# Patient Record
Sex: FEMALE | Race: WHITE | NOT HISPANIC OR LATINO | Employment: STUDENT | URBAN - METROPOLITAN AREA
[De-identification: names, ages, dates, MRNs, and addresses within clinical notes are randomized per-mention and may not be internally consistent; named-entity substitution may affect disease eponyms.]

---

## 2017-11-14 ENCOUNTER — HOSPITAL ENCOUNTER (OUTPATIENT)
Dept: RADIOLOGY | Facility: HOSPITAL | Age: 22
Discharge: HOME | End: 2017-11-14
Payer: COMMERCIAL

## 2017-11-14 ENCOUNTER — TRANSCRIBE ORDERS (OUTPATIENT)
Dept: ADMINISTRATIVE | Facility: HOSPITAL | Age: 22
End: 2017-11-14

## 2017-11-14 DIAGNOSIS — R05.9 COUGH: ICD-10-CM

## 2017-11-14 DIAGNOSIS — R05.9 COUGH: Primary | ICD-10-CM

## 2017-11-14 PROCEDURE — 71020 HB CHEST X-RAY 2VW FRONTAL&LATL: CPT

## 2019-03-28 ENCOUNTER — HOSPITAL ENCOUNTER (EMERGENCY)
Facility: HOSPITAL | Age: 24
Discharge: HOME | End: 2019-03-28
Attending: EMERGENCY MEDICINE | Admitting: EMERGENCY MEDICINE

## 2019-03-28 ENCOUNTER — TRANSCRIBE ORDERS (OUTPATIENT)
Dept: ADMINISTRATIVE | Facility: HOSPITAL | Age: 24
End: 2019-03-28

## 2019-03-28 ENCOUNTER — APPOINTMENT (EMERGENCY)
Dept: ULTRASOUND IMAGING | Facility: HOSPITAL | Age: 24
End: 2019-03-28

## 2019-03-28 VITALS
BODY MASS INDEX: 24.1 KG/M2 | HEART RATE: 82 BPM | RESPIRATION RATE: 18 BRPM | HEIGHT: 63 IN | OXYGEN SATURATION: 97 % | WEIGHT: 136 LBS | DIASTOLIC BLOOD PRESSURE: 63 MMHG | SYSTOLIC BLOOD PRESSURE: 125 MMHG

## 2019-03-28 DIAGNOSIS — M79.662 PAIN OF LEFT CALF: Primary | ICD-10-CM

## 2019-03-28 DIAGNOSIS — M79.605 LEFT LEG PAIN: Primary | ICD-10-CM

## 2019-03-28 LAB
ANION GAP SERPL CALCULATED.3IONS-SCNC: 10 MMOL/L (ref 4–13)
BASOPHILS # BLD AUTO: 0.03 THOUSANDS/ΜL (ref 0–0.1)
BASOPHILS NFR BLD AUTO: 0 % (ref 0–1)
BUN SERPL-MCNC: 14 MG/DL (ref 5–25)
CALCIUM SERPL-MCNC: 9.2 MG/DL (ref 8.3–10.1)
CHLORIDE SERPL-SCNC: 105 MMOL/L (ref 100–108)
CO2 SERPL-SCNC: 27 MMOL/L (ref 21–32)
CREAT SERPL-MCNC: 0.74 MG/DL (ref 0.6–1.3)
EOSINOPHIL # BLD AUTO: 0.2 THOUSAND/ΜL (ref 0–0.61)
EOSINOPHIL NFR BLD AUTO: 2 % (ref 0–6)
ERYTHROCYTE [DISTWIDTH] IN BLOOD BY AUTOMATED COUNT: 11.6 % (ref 11.6–15.1)
EXT PREG TEST URINE: NEGATIVE
GFR SERPL CREATININE-BSD FRML MDRD: 115 ML/MIN/1.73SQ M
GLUCOSE SERPL-MCNC: 105 MG/DL (ref 65–140)
HCT VFR BLD AUTO: 40.1 % (ref 34.8–46.1)
HGB BLD-MCNC: 13.9 G/DL (ref 11.5–15.4)
IMM GRANULOCYTES # BLD AUTO: 0.02 THOUSAND/UL (ref 0–0.2)
IMM GRANULOCYTES NFR BLD AUTO: 0 % (ref 0–2)
LYMPHOCYTES # BLD AUTO: 3.48 THOUSANDS/ΜL (ref 0.6–4.47)
LYMPHOCYTES NFR BLD AUTO: 41 % (ref 14–44)
MCH RBC QN AUTO: 31.6 PG (ref 26.8–34.3)
MCHC RBC AUTO-ENTMCNC: 34.7 G/DL (ref 31.4–37.4)
MCV RBC AUTO: 91 FL (ref 82–98)
MONOCYTES # BLD AUTO: 0.44 THOUSAND/ΜL (ref 0.17–1.22)
MONOCYTES NFR BLD AUTO: 5 % (ref 4–12)
NEUTROPHILS # BLD AUTO: 4.31 THOUSANDS/ΜL (ref 1.85–7.62)
NEUTS SEG NFR BLD AUTO: 52 % (ref 43–75)
NRBC BLD AUTO-RTO: 0 /100 WBCS
PLATELET # BLD AUTO: 248 THOUSANDS/UL (ref 149–390)
PMV BLD AUTO: 10.3 FL (ref 8.9–12.7)
POTASSIUM SERPL-SCNC: 3.5 MMOL/L (ref 3.5–5.3)
RBC # BLD AUTO: 4.4 MILLION/UL (ref 3.81–5.12)
SODIUM SERPL-SCNC: 142 MMOL/L (ref 136–145)
WBC # BLD AUTO: 8.48 THOUSAND/UL (ref 4.31–10.16)

## 2019-03-28 PROCEDURE — 36415 COLL VENOUS BLD VENIPUNCTURE: CPT | Performed by: PHYSICIAN ASSISTANT

## 2019-03-28 PROCEDURE — 80048 BASIC METABOLIC PNL TOTAL CA: CPT | Performed by: PHYSICIAN ASSISTANT

## 2019-03-28 PROCEDURE — 93971 EXTREMITY STUDY: CPT

## 2019-03-28 PROCEDURE — 85025 COMPLETE CBC W/AUTO DIFF WBC: CPT | Performed by: PHYSICIAN ASSISTANT

## 2019-03-28 PROCEDURE — 99284 EMERGENCY DEPT VISIT MOD MDM: CPT

## 2019-03-28 PROCEDURE — 81025 URINE PREGNANCY TEST: CPT | Performed by: PHYSICIAN ASSISTANT

## 2019-03-28 NOTE — ED PROVIDER NOTES
History  Chief Complaint   Patient presents with    Leg Pain     Pt  ambulated on unit, saw school nurse today for L calf pain Hx of blood clots  told to come to ED for ultrasound      Mary Cruz is a 26 y o  female with a PMHx of DVT/PE who presents to the ED with complaints of worsening left posterior/calf pain x 3 days  Patient states she currently is on birth control (Progesterone only)  Patient states she had a DVT in June 2018 and she was placed on Xerelto for 6 months  Patient was previously seen and evaluated by Hematology negative workup was completed at this time  Patient states she recently returned from a trip to [place] which she took via a plane last week  Denies injury, fall, numbness, tingling, swelling, redness, decreased range of motion, fever, chills  History provided by:  Patient  Leg Pain   Location:  Leg  Leg location:  L lower leg  Pain details:     Quality:  Throbbing and cramping    Severity:  Moderate    Duration:  3 days    Timing:  Constant    Progression:  Worsening  Chronicity:  New  Associated symptoms: no back pain, no decreased ROM, no fatigue, no fever, no itching, no muscle weakness, no neck pain, no numbness, no stiffness, no swelling and no tingling        None       Past Medical History:   Diagnosis Date    [facility]VT (deep venous thrombosis) ([facility] )     PE (pulmonary thromboembolism) ([facility] )        History reviewed  No pertinent surgical history  History reviewed  No pertinent family history  I have reviewed and agree with the history as documented  Social History     Tobacco Use    Smoking status: Never Smoker    Smokeless tobacco: Never Used   Substance Use Topics    Alcohol use: Yes     Comment: weekly     Drug use: Never        Review of Systems   Constitutional: Negative for appetite change, chills, fatigue, fever and unexpected weight change  HENT: Negative for congestion, drooling, ear pain, rhinorrhea, sore throat, trouble swallowing and voice change  Eyes: Negative for pain, discharge, redness and visual disturbance  Respiratory: Negative for cough, shortness of breath, wheezing and stridor  Cardiovascular: Negative for chest pain, palpitations and leg swelling  Gastrointestinal: Negative for abdominal pain, blood in stool, constipation, diarrhea, nausea and vomiting  Genitourinary: Negative for dysuria, flank pain, frequency, hematuria and urgency  Musculoskeletal: Positive for arthralgias  Negative for back pain, gait problem, joint swelling, neck pain, neck stiffness and stiffness  Skin: Negative for color change, itching and rash  Neurological: Negative for dizziness, seizures, light-headedness and headaches  Physical Exam  Physical Exam   Constitutional: She is oriented to person, place, and time  She appears well-developed and well-nourished  No distress  HENT:   Head: Normocephalic and atraumatic  Nose: Nose normal    Mouth/Throat: Oropharynx is clear and moist    Eyes: Pupils are equal, round, and reactive to light  Conjunctivae and EOM are normal    Neck: Normal range of motion  Neck supple  Cardiovascular: Normal rate, regular rhythm and intact distal pulses  Pulmonary/Chest: Effort normal and breath sounds normal    Musculoskeletal: Normal range of motion  Bilateral lower extremities with compression stockings  No erythema or edema  Mild TTP of the left calf  Positive jose ramon's sign  Neurological: She is alert and oriented to person, place, and time  She has normal strength and normal reflexes  GCS eye subscore is 4  GCS verbal subscore is 5  GCS motor subscore is 6  Skin: Skin is warm and dry  Capillary refill takes less than 2 seconds  Psychiatric: She has a normal mood and affect  Nursing note and vitals reviewed        Vital Signs  ED Triage Vitals [03/28/19 1914]   Temp Pulse Respirations Blood Pressure SpO2   -- 82 18 125/63 97 %      Temp src Heart Rate Source Patient Position - Orthostatic VS BP Location FiO2 (%)   -- Monitor -- -- --      Pain Score       5           Vitals:    03/28/19 1914   BP: 125/63   Pulse: 82         Visual Acuity      ED Medications  Medications - No data to display    Diagnostic Studies  Results Reviewed     Procedure Component Value Units Date/Time    Basic metabolic panel [[id number]] Collected:  03/28/19 1956    Lab Status:  Final result Specimen:  Blood from Arm, Right Updated:  03/28/19 2011     Sodium 142 mmol/L      Potassium 3 5 mmol/L      Chloride 105 mmol/L      CO2 27 mmol/L      ANION GAP 10 mmol/L      BUN 14 mg/dL      Creatinine 0 74 mg/dL      Glucose 105 mg/dL      Calcium 9 2 mg/dL      eGFR 115 ml/min/1 73sq m     Narrative:       National Kidney Disease Education Program recommendations are as follows:  GFR calculation is accurate only with a steady state creatinine  Chronic Kidney disease less than 60 ml/min/1 73 sq  meters  Kidney failure less than 15 ml/min/1 73 sq  meters      POCT pregnancy, urine [[id number]]  (Normal) Resulted:  03/28/19 2006    Lab Status:  Final result Updated:  03/28/19 2006     EXT PREG TEST UR (Ref: Negative) NEGATIVE    CBC and differential [[id number]] Collected:  03/28/19 1956    Lab Status:  Final result Specimen:  Blood from Arm, Right Updated:  03/28/19 2001     WBC 8 48 Thousand/uL      RBC 4 40 Million/uL      Hemoglobin 13 9 g/dL      Hematocrit 40 1 %      MCV 91 fL      MCH 31 6 pg      MCHC 34 7 g/dL      RDW 11 6 %      MPV 10 3 fL      Platelets 814 Thousands/uL      nRBC 0 /100 WBCs      Neutrophils Relative 52 %      Immat GRANS % 0 %      Lymphocytes Relative 41 %      Monocytes Relative 5 %      Eosinophils Relative 2 %      Basophils Relative 0 %      Neutrophils Absolute 4 31 Thousands/µL      Immature Grans Absolute 0 02 Thousand/uL      Lymphocytes Absolute 3 48 Thousands/µL      Monocytes Absolute 0 44 Thousand/µL      Eosinophils Absolute 0 20 Thousand/µL      Basophils Absolute 0 03 Thousands/µL                  VAS lower limb venous duplex study, unilateral/limited    (Results Pending)              Procedures  Procedures       Phone Contacts  ED Phone Contact    ED Course  ED Course as of Mar 28 2258   Thu Mar 28, 2019   2122 Informed by US that [address] is negative for DVT at this time  2122 Educated patient regarding diagnosis and management  Advised patient to follow up with PCP  Advised patient to RTER for persistent or worsening symptoms  MDM  Number of Diagnoses or Management Options  Pain of left calf: new and does not require workup  Diagnosis management comments: Differential diagnosis included but not limited to: Sprain, strain, muscle spasm, DVT, baker's cyst    Labs WNL  Urine pregnancy negative  US or LLE negative for DVT  I provided patient with strict RTER precautions  I advised patient follow-up with PCP in 24-48 hours  Patient verbalized understanding  Amount and/or Complexity of Data Reviewed  Clinical lab tests: reviewed and ordered  Tests in the radiology section of CPT®: ordered and reviewed  Review and summarize past medical records: yes    Risk of Complications, Morbidity, and/or Mortality  Presenting problems: moderate  Diagnostic procedures: moderate  Management options: moderate    Patient Progress  Patient progress: stable      Disposition  Final diagnoses:   Pain of left calf     Time reflects when diagnosis was documented in both MDM as applicable and the Disposition within this note     Time User Action Codes Description Comment    3/28/2019  9:23 PM Lisa Parham Add [V36 [id number]] Pain of left calf       ED Disposition     ED Disposition Condition Date/Time Comment    Discharge Stable Thu Mar 28, 2019  9:23 PM Jan Lin discharge to home/self care              Follow-up Information     Follow up With Specialties Details Why Contact Info Additional [address] Emergency Department Emergency Medicine Go to  If symptoms worsen [address] [facility] [address] [phone] [facility] ED, [place], [address]    Jeovany Murphy MD Emergency Medicine Schedule an appointment as soon as possible for a visit   [address] [place] [id number] [phone]             There are no discharge medications for this patient  No discharge procedures on file      ED Provider  Electronically Signed by           Bruna Coelho PA-C  03/28/19 5784

## 2019-03-29 PROCEDURE — 93971 EXTREMITY STUDY: CPT | Performed by: SURGERY

## 2019-03-29 NOTE — DISCHARGE INSTRUCTIONS
Leg Cramps   WHAT YOU NEED TO KNOW:   A leg cramp is a sudden, painful squeeze in your calf (lower leg) or thigh (upper leg) muscles  The muscle may twitch under the skin or feel hard  Your leg may feel sore long after the muscles relax  DISCHARGE INSTRUCTIONS:   To stretch your leg:  Warm up your muscles before you stretch  Take a short walk or run slowly in place  If you get leg cramps while you sleep, you may also want to stretch before bedtime  The following exercises stretch the calves and thighs to help stop or prevent leg cramps:  · To stretch your calf and heel:      ¨ Stand up and place the palms of your hands against a wall  ¨ Lean into the wall, with one leg behind the other  Bend the front leg and keep the back leg straight  ¨ Press the heel of your back leg into the floor  ¨ Hold for 15 to 30 seconds, then switch sides  · To stretch the back of your knee and thigh:      ¨ Sit on the floor with your legs straight in front of you  Do not point your toes  ¨ Place the palms of your hands at your sides on the floor  Slide your hands past your hips toward your ankles  ¨ Move toward your ankles until you feel a stretch in the back of your legs  Do not try to touch your head to your knees or round your back  ¨ Hold for 30 seconds  Drink plenty of liquids during exercise:  Water and other liquids help prevent cramps by replacing fluids lost in sweat  Drink more liquids when you are active in hot weather  To stop a leg cramp if it happens again:   · Stretch and massage your muscle to stop the cramp  · Apply heat or ice packs to the cramp  A heating pad or hot pack may help relieve tight muscles  An ice pack or crushed ice in a bag, wrapped in a towel can soothe tender or sore muscles  Take your medicine as directed:  Call your healthcare provider if you think your medicine is not helping or if you have side effects   Tell him if you are taking any vitamins, herbs, or other medicines  Keep a list of the medicines you take  Include the amounts, and when and why you take them  Bring the list or the pill bottles to follow-up visits  Follow up with your healthcare provider as directed:  Write down any questions you have so you remember to ask them in your follow-up visits  Contact your healthcare provider if:   · Your leg cramps get worse or happen more often  · Your leg feels numb  · Your leg cramps do not go away with stretching or massage  · You feel dizzy, lightheaded, or confused when you exercise in hot weather  You may have a headache or blurred vision  © 2017 [facility] Information is for End User's use only and may not be sold, redistributed or otherwise used for commercial purposes  All illustrations and images included in CareNotes® are the copyrighted property of [employer]  or [employer]  The above information is an  only  It is not intended as medical advice for individual conditions or treatments  Talk to your doctor, nurse or pharmacist before following any medical regimen to see if it is safe and effective for you